# Patient Record
Sex: MALE | Race: BLACK OR AFRICAN AMERICAN | Employment: FULL TIME | URBAN - METROPOLITAN AREA
[De-identification: names, ages, dates, MRNs, and addresses within clinical notes are randomized per-mention and may not be internally consistent; named-entity substitution may affect disease eponyms.]

---

## 2024-04-23 ENCOUNTER — OFFICE VISIT (OUTPATIENT)
Dept: UROLOGY | Facility: CLINIC | Age: 44
End: 2024-04-23
Payer: COMMERCIAL

## 2024-04-23 VITALS
HEART RATE: 68 BPM | BODY MASS INDEX: 37.23 KG/M2 | WEIGHT: 251.4 LBS | SYSTOLIC BLOOD PRESSURE: 129 MMHG | HEIGHT: 69 IN | OXYGEN SATURATION: 99 % | DIASTOLIC BLOOD PRESSURE: 87 MMHG | TEMPERATURE: 97.3 F

## 2024-04-23 DIAGNOSIS — R35.0 URINARY FREQUENCY: Primary | ICD-10-CM

## 2024-04-23 LAB
ALBUMIN UR-MCNC: NEGATIVE MG/DL
APPEARANCE UR: CLEAR
BILIRUB UR QL STRIP: NEGATIVE
COLOR UR AUTO: YELLOW
GLUCOSE UR STRIP-MCNC: NEGATIVE MG/DL
HGB UR QL STRIP: NEGATIVE
KETONES UR STRIP-MCNC: NEGATIVE MG/DL
LEUKOCYTE ESTERASE UR QL STRIP: NEGATIVE
NITRATE UR QL: NEGATIVE
PH UR STRIP: 7 [PH] (ref 5–7)
SP GR UR STRIP: 1.01 (ref 1–1.03)
UROBILINOGEN UR STRIP-ACNC: 2 E.U./DL

## 2024-04-23 PROCEDURE — 99203 OFFICE O/P NEW LOW 30 MIN: CPT | Performed by: STUDENT IN AN ORGANIZED HEALTH CARE EDUCATION/TRAINING PROGRAM

## 2024-04-23 PROCEDURE — 81003 URINALYSIS AUTO W/O SCOPE: CPT | Performed by: STUDENT IN AN ORGANIZED HEALTH CARE EDUCATION/TRAINING PROGRAM

## 2024-04-23 RX ORDER — SOLIFENACIN SUCCINATE 5 MG/1
5 TABLET, FILM COATED ORAL DAILY
Qty: 90 TABLET | Refills: 1 | Status: SHIPPED | OUTPATIENT
Start: 2024-04-23

## 2024-04-23 ASSESSMENT — PAIN SCALES - GENERAL: PAINLEVEL: NO PAIN (0)

## 2024-04-23 NOTE — NURSING NOTE
"Initial /87   Pulse 68   Temp 97.3  F (36.3  C) (Tympanic)   Ht 1.746 m (5' 8.75\")   Wt 114 kg (251 lb 6.4 oz)   SpO2 99%   BMI 37.40 kg/m   Estimated body mass index is 37.4 kg/m  as calculated from the following:    Height as of this encounter: 1.746 m (5' 8.75\").    Weight as of this encounter: 114 kg (251 lb 6.4 oz). .  Active order to obtain bladder scan? Yes   Name of ordering provider:  Connie Blank  Bladder scan preformed post void Yes, about 15 minutes after.  Bladder scan reveled 15ML  Provider notified?  Yes    Ronel BANKS CMA        "

## 2024-04-23 NOTE — PROGRESS NOTES
"        Chief Complaint:   LUTS         History of Present Illness:   Jian Lindsay is a 43 year old male with no significant past medical history who presents for evaluation of LUTS.     The patient reports a 2-3 year history of urinary frequency, urgency, occasional urge incontinence, and nocturia x 2-3. He reports occasional sensation of incomplete bladder emptying. He denies weak stream, dysuria, and gross hematuria.     He reports regular bowel movements.     He drinks water, 2 cups of coffee, and Gatorade.          Past Medical History:   No past medical history on file.         Past Surgical History:   No past surgical history on file.         Medications     No current outpatient medications on file.     No current facility-administered medications for this visit.            Allergies:   Patient has no known allergies.         Review of Systems:  From intake questionnaire   Negative 14 system review except as noted on HPI, nurse's note.         Physical Exam:   Patient is a 43 year old  male   Vitals: Blood pressure 129/87, pulse 68, temperature 97.3  F (36.3  C), temperature source Tympanic, height 1.746 m (5' 8.75\"), weight 114 kg (251 lb 6.4 oz), SpO2 99%.  General Appearance Adult: Alert, no acute distress, oriented.  Lungs: Non-labored breathing.  Heart: No obvious jugular venous distension present.  Neuro: Alert, oriented, speech and mentation normal    PVR: 15 mL         Assessment and Plan:     Assessment: 43 year old male seen in evaluation for urinary frequency, urgency, occasional urge incontinence, and nocturia x 2-3. He reports occasional sensation of incomplete bladder emptying. His PVR was 15 mL. UA was unremarkable.     We discussed overactive bladder as a possible cause for his symptoms. We discussed treatment options including pelvic floor physical therapy, anticholinergics, and third line procedures. The patient would like to try a medication.     Plan:  Start solifenacin 5 mg daily. Side " effects reviewed.   Follow up in 2-3 months, sooner if concerns.     EVON ROCHE PA-C  Department of Urology

## 2024-09-22 ENCOUNTER — HEALTH MAINTENANCE LETTER (OUTPATIENT)
Age: 44
End: 2024-09-22

## 2024-10-01 ENCOUNTER — OFFICE VISIT (OUTPATIENT)
Dept: FAMILY MEDICINE | Facility: CLINIC | Age: 44
End: 2024-10-01
Payer: COMMERCIAL

## 2024-10-01 VITALS
TEMPERATURE: 97.9 F | BODY MASS INDEX: 36.76 KG/M2 | RESPIRATION RATE: 16 BRPM | OXYGEN SATURATION: 99 % | HEART RATE: 67 BPM | DIASTOLIC BLOOD PRESSURE: 81 MMHG | HEIGHT: 69 IN | WEIGHT: 248.2 LBS | SYSTOLIC BLOOD PRESSURE: 137 MMHG

## 2024-10-01 DIAGNOSIS — Z13.220 LIPID SCREENING: ICD-10-CM

## 2024-10-01 DIAGNOSIS — Z13.1 SCREENING FOR DIABETES MELLITUS: ICD-10-CM

## 2024-10-01 DIAGNOSIS — L73.0 ACNE KELOIDALIS NUCHAE: ICD-10-CM

## 2024-10-01 DIAGNOSIS — N50.89 TESTICULAR LUMP: ICD-10-CM

## 2024-10-01 DIAGNOSIS — Z00.00 ROUTINE GENERAL MEDICAL EXAMINATION AT A HEALTH CARE FACILITY: Primary | ICD-10-CM

## 2024-10-01 DIAGNOSIS — R79.89 LOW TESTOSTERONE IN MALE: ICD-10-CM

## 2024-10-01 LAB
ALBUMIN UR-MCNC: NEGATIVE MG/DL
APPEARANCE UR: CLEAR
BASOPHILS # BLD AUTO: 0 10E3/UL (ref 0–0.2)
BASOPHILS NFR BLD AUTO: 0 %
BILIRUB UR QL STRIP: NEGATIVE
COLOR UR AUTO: YELLOW
EOSINOPHIL # BLD AUTO: 0.1 10E3/UL (ref 0–0.7)
EOSINOPHIL NFR BLD AUTO: 2 %
ERYTHROCYTE [DISTWIDTH] IN BLOOD BY AUTOMATED COUNT: 11.8 % (ref 10–15)
EST. AVERAGE GLUCOSE BLD GHB EST-MCNC: 105 MG/DL
GLUCOSE UR STRIP-MCNC: NEGATIVE MG/DL
HBA1C MFR BLD: 5.3 % (ref 0–5.6)
HCT VFR BLD AUTO: 46.5 % (ref 40–53)
HGB BLD-MCNC: 16.1 G/DL (ref 13.3–17.7)
HGB UR QL STRIP: ABNORMAL
IMM GRANULOCYTES # BLD: 0 10E3/UL
IMM GRANULOCYTES NFR BLD: 0 %
KETONES UR STRIP-MCNC: NEGATIVE MG/DL
LEUKOCYTE ESTERASE UR QL STRIP: NEGATIVE
LYMPHOCYTES # BLD AUTO: 2.4 10E3/UL (ref 0.8–5.3)
LYMPHOCYTES NFR BLD AUTO: 42 %
MCH RBC QN AUTO: 29.9 PG (ref 26.5–33)
MCHC RBC AUTO-ENTMCNC: 34.6 G/DL (ref 31.5–36.5)
MCV RBC AUTO: 86 FL (ref 78–100)
MONOCYTES # BLD AUTO: 0.4 10E3/UL (ref 0–1.3)
MONOCYTES NFR BLD AUTO: 6 %
NEUTROPHILS # BLD AUTO: 2.8 10E3/UL (ref 1.6–8.3)
NEUTROPHILS NFR BLD AUTO: 49 %
NITRATE UR QL: NEGATIVE
PH UR STRIP: 6.5 [PH] (ref 5–7)
PLATELET # BLD AUTO: 165 10E3/UL (ref 150–450)
RBC # BLD AUTO: 5.38 10E6/UL (ref 4.4–5.9)
RBC #/AREA URNS AUTO: ABNORMAL /HPF
SP GR UR STRIP: 1.01 (ref 1–1.03)
SQUAMOUS #/AREA URNS AUTO: ABNORMAL /LPF
UROBILINOGEN UR STRIP-ACNC: 1 E.U./DL
WBC # BLD AUTO: 5.7 10E3/UL (ref 4–11)
WBC #/AREA URNS AUTO: ABNORMAL /HPF

## 2024-10-01 PROCEDURE — 36415 COLL VENOUS BLD VENIPUNCTURE: CPT | Performed by: PHYSICIAN ASSISTANT

## 2024-10-01 PROCEDURE — 81001 URINALYSIS AUTO W/SCOPE: CPT | Performed by: PHYSICIAN ASSISTANT

## 2024-10-01 PROCEDURE — 80053 COMPREHEN METABOLIC PANEL: CPT | Performed by: PHYSICIAN ASSISTANT

## 2024-10-01 PROCEDURE — 84403 ASSAY OF TOTAL TESTOSTERONE: CPT | Performed by: PHYSICIAN ASSISTANT

## 2024-10-01 PROCEDURE — 84270 ASSAY OF SEX HORMONE GLOBUL: CPT | Performed by: PHYSICIAN ASSISTANT

## 2024-10-01 PROCEDURE — 99213 OFFICE O/P EST LOW 20 MIN: CPT | Mod: 25 | Performed by: PHYSICIAN ASSISTANT

## 2024-10-01 PROCEDURE — 83036 HEMOGLOBIN GLYCOSYLATED A1C: CPT | Performed by: PHYSICIAN ASSISTANT

## 2024-10-01 PROCEDURE — 99386 PREV VISIT NEW AGE 40-64: CPT | Mod: 25 | Performed by: PHYSICIAN ASSISTANT

## 2024-10-01 PROCEDURE — 85025 COMPLETE CBC W/AUTO DIFF WBC: CPT | Performed by: PHYSICIAN ASSISTANT

## 2024-10-01 PROCEDURE — 80061 LIPID PANEL: CPT | Performed by: PHYSICIAN ASSISTANT

## 2024-10-01 PROCEDURE — 90471 IMMUNIZATION ADMIN: CPT | Performed by: PHYSICIAN ASSISTANT

## 2024-10-01 PROCEDURE — 90656 IIV3 VACC NO PRSV 0.5 ML IM: CPT | Performed by: PHYSICIAN ASSISTANT

## 2024-10-01 RX ORDER — MUPIROCIN 20 MG/G
OINTMENT TOPICAL 2 TIMES DAILY
COMMUNITY
Start: 2024-09-16

## 2024-10-01 RX ORDER — MOMETASONE FUROATE 1 MG/ML
SOLUTION TOPICAL DAILY
COMMUNITY
Start: 2024-09-10

## 2024-10-01 RX ORDER — CLINDAMYCIN AND BENZOYL PEROXIDE 10; 50 MG/G; MG/G
GEL TOPICAL 2 TIMES DAILY
COMMUNITY
Start: 2024-09-01

## 2024-10-01 RX ORDER — DOXYCYCLINE 100 MG/1
100 CAPSULE ORAL 2 TIMES DAILY
COMMUNITY
Start: 2024-09-10

## 2024-10-01 SDOH — HEALTH STABILITY: PHYSICAL HEALTH: ON AVERAGE, HOW MANY MINUTES DO YOU ENGAGE IN EXERCISE AT THIS LEVEL?: 100 MIN

## 2024-10-01 SDOH — HEALTH STABILITY: PHYSICAL HEALTH: ON AVERAGE, HOW MANY DAYS PER WEEK DO YOU ENGAGE IN MODERATE TO STRENUOUS EXERCISE (LIKE A BRISK WALK)?: 5 DAYS

## 2024-10-01 ASSESSMENT — PAIN SCALES - GENERAL: PAINLEVEL: NO PAIN (0)

## 2024-10-01 ASSESSMENT — SOCIAL DETERMINANTS OF HEALTH (SDOH): HOW OFTEN DO YOU GET TOGETHER WITH FRIENDS OR RELATIVES?: MORE THAN THREE TIMES A WEEK

## 2024-10-01 NOTE — LETTER
October 8, 2024      Jian Lindsay  [address]  [place] [address]        Dear Mr. Lindsay,      Your testosterone is slightly low. I am going to place a future order to recheck this in 2-6 weeks. Please make an early morning lab only appointment to have this completed.      LDL cholesterol as well as triglycerides elevated.      -LDL(bad) cholesterol level is elevated, and your triglycerides are elevated which can increase your heart disease risk.  A diet high in fat and simple carbohydrates, genetics and being overweight can contribute to this. ADVISE: exercising 150 minutes of aerobic exercise per week (30 minutes for 5 days per week or 50 minutes for 3 days per week are options), eating a low saturated fat/low carbohydrate diet, and omega-3 fatty acids (fish oil) 1367-1798 mg daily are helpful to improve this. In 12 months, you should recheck your fasting cholesterol panel by scheduling a lab-only appointment.      Normal hemoglobin A1c without evidence of prediabetes or diabetes.      If any further questions please reach out to the clinic.      Resulted Orders   Lipid panel reflex to direct LDL Non-fasting   Result Value Ref Range    Cholesterol 200 (H) <200 mg/dL    Triglycerides 193 (H) <150 mg/dL    Direct Measure HDL 38 (L) >=40 mg/dL    LDL Cholesterol Calculated 123 (H) <100 mg/dL    Non HDL Cholesterol 162 (H) <130 mg/dL    Patient Fasting > 8hrs? No     Narrative    Cholesterol  Desirable: < 200 mg/dL  Borderline High: 200 - 239 mg/dL  High: >= 240 mg/dL    Triglycerides  Normal: < 150 mg/dL  Borderline High: 150 - 199 mg/dL  High: 200-499 mg/dL  Very High: >= 500 mg/dL    Direct Measure HDL  Female: >= 50 mg/dL   Male: >= 40 mg/dL    LDL Cholesterol  Desirable: < 100 mg/dL  Above Desirable: 100 - 129 mg/dL   Borderline High: 130 - 159 mg/dL   High:  160 - 189 mg/dL   Very High: >= 190 mg/dL    Non HDL Cholesterol  Desirable: < 130 mg/dL  Above Desirable: 130 - 159 mg/dL  Borderline High: 160 -  189 mg/dL  High: 190 - 219 mg/dL  Very High: >= 220 mg/dL   Hemoglobin A1c   Result Value Ref Range    Estimated Average Glucose 105 <117 mg/dL    Hemoglobin A1C 5.3 0.0 - 5.6 %      Comment:      Normal <5.7%   Prediabetes 5.7-6.4%    Diabetes 6.5% or higher     Note: Adopted from ADA consensus guidelines.   Comprehensive metabolic panel (BMP + Alb, Alk Phos, ALT, AST, Total. Bili, TP)   Result Value Ref Range    Sodium 141 135 - 145 mmol/L    Potassium 3.9 3.4 - 5.3 mmol/L    Carbon Dioxide (CO2) 28 22 - 29 mmol/L    Anion Gap 8 7 - 15 mmol/L    Urea Nitrogen 8.8 6.0 - 20.0 mg/dL    Creatinine 1.07 0.67 - 1.17 mg/dL    GFR Estimate 88 >60 mL/min/1.73m2      Comment:      eGFR calculated using 2021 CKD-EPI equation.    Calcium 9.1 8.8 - 10.4 mg/dL      Comment:      Reference intervals for this test were updated on 7/16/2024 to reflect our healthy population more accurately. There may be differences in the flagging of prior results with similar values performed with this method. Those prior results can be interpreted in the context of the updated reference intervals.    Chloride 105 98 - 107 mmol/L    Glucose 104 (H) 70 - 99 mg/dL    Alkaline Phosphatase 75 40 - 150 U/L    AST 22 0 - 45 U/L    ALT 23 0 - 70 U/L    Protein Total 7.7 6.4 - 8.3 g/dL    Albumin 4.2 3.5 - 5.2 g/dL    Bilirubin Total 0.7 <=1.2 mg/dL    Patient Fasting > 8hrs? No    UA Macroscopic with reflex to Microscopic and Culture - Lab Collect   Result Value Ref Range    Color Urine Yellow Colorless, Straw, Light Yellow, Yellow    Appearance Urine Clear Clear    Glucose Urine Negative Negative mg/dL    Bilirubin Urine Negative Negative    Ketones Urine Negative Negative mg/dL    Specific Gravity Urine 1.015 1.003 - 1.035    Blood Urine Trace (A) Negative    pH Urine 6.5 5.0 - 7.0    Protein Albumin Urine Negative Negative mg/dL    Urobilinogen Urine 1.0 0.2, 1.0 E.U./dL    Nitrite Urine Negative Negative    Leukocyte Esterase Urine Negative Negative    CBC with platelets and differential   Result Value Ref Range    WBC Count 5.7 4.0 - 11.0 10e3/uL    RBC Count 5.38 4.40 - 5.90 10e6/uL    Hemoglobin 16.1 13.3 - 17.7 g/dL    Hematocrit 46.5 40.0 - 53.0 %    MCV 86 78 - 100 fL    MCH 29.9 26.5 - 33.0 pg    MCHC 34.6 31.5 - 36.5 g/dL    RDW 11.8 10.0 - 15.0 %    Platelet Count 165 150 - 450 10e3/uL    % Neutrophils 49 %    % Lymphocytes 42 %    % Monocytes 6 %    % Eosinophils 2 %    % Basophils 0 %    % Immature Granulocytes 0 %    Absolute Neutrophils 2.8 1.6 - 8.3 10e3/uL    Absolute Lymphocytes 2.4 0.8 - 5.3 10e3/uL    Absolute Monocytes 0.4 0.0 - 1.3 10e3/uL    Absolute Eosinophils 0.1 0.0 - 0.7 10e3/uL    Absolute Basophils 0.0 0.0 - 0.2 10e3/uL    Absolute Immature Granulocytes 0.0 <=0.4 10e3/uL   Sex Hormone Binding Globulin   Result Value Ref Range    Sex Hormone Binding Globulin 25 11 - 80 nmol/L   Testosterone Free and Total   Result Value Ref Range    Free Testosterone Calculated 5.06 ng/dL      Comment:      Male Danyel Ranges:  Danyel Stage I: Less than or equal to 0.37 ng/dL  Danyel Stage II: 0.03-2.1 ng/dL  Danyel Stage III: 0.10-9.8 ng/dL  Danyel Stage IV: 3.5-16.9 ng/dL  Danyel Stage V: 4.1-23.9 ng/dL    Testosterone Total 226 (L) 240 - 950 ng/dL    Narrative    This test was developed and its performance characteristics determined by the [facility],  Special [facility] Laboratory. It has not been cleared or approved by the FDA. The laboratory is regulated under CLIA as qualified to perform high-complexity testing. This test is used for clinical purposes. It should not be regarded as investigational or for research.   UA Microscopic with Reflex to Culture   Result Value Ref Range    RBC Urine None Seen 0-2 /HPF /HPF    WBC Urine None Seen 0-5 /HPF /HPF    Squamous Epithelials Urine Few (A) None Seen /LPF    Narrative    Urine Culture not indicated     If you have any questions or concerns, please call the clinic at the  number listed above.     Sincerely,        Alexandru Srinivasan PA-C/bmc

## 2024-10-01 NOTE — PATIENT INSTRUCTIONS
Patient Education   Preventive Care Advice   This is general advice given by our system to help you stay healthy. However, your care team may have specific advice just for you. Please talk to your care team about your preventive care needs.  Nutrition  Eat 5 or more servings of fruits and vegetables each day.  Try wheat bread, brown rice and whole grain pasta (instead of white bread, rice, and pasta).  Get enough calcium and vitamin D. Check the label on foods and aim for 100% of the RDA (recommended daily allowance).  Lifestyle  Exercise at least 150 minutes each week  (30 minutes a day, 5 days a week).  Do muscle strengthening activities 2 days a week. These help control your weight and prevent disease.  No smoking.  Wear sunscreen to prevent skin cancer.  Have a dental exam and cleaning every 6 months.  Yearly exams  See your health care team every year to talk about:  Any changes in your health.  Any medicines your care team has prescribed.  Preventive care, family planning, and ways to prevent chronic diseases.  Shots (vaccines)   HPV shots (up to age 26), if you've never had them before.  Hepatitis B shots (up to age 59), if you've never had them before.  COVID-19 shot: Get this shot when it's due.  Flu shot: Get a flu shot every year.  Tetanus shot: Get a tetanus shot every 10 years.  Pneumococcal, hepatitis A, and RSV shots: Ask your care team if you need these based on your risk.  Shingles shot (for age 50 and up)  General health tests  Diabetes screening:  Starting at age 35, Get screened for diabetes at least every 3 years.  If you are younger than age 35, ask your care team if you should be screened for diabetes.  Cholesterol test: At age 39, start having a cholesterol test every 5 years, or more often if advised.  Bone density scan (DEXA): At age 50, ask your care team if you should have this scan for osteoporosis (brittle bones).  Hepatitis C: Get tested at least once in your life.  STIs (sexually  transmitted infections)  Before age 24: Ask your care team if you should be screened for STIs.  After age 24: Get screened for STIs if you're at risk. You are at risk for STIs (including HIV) if:  You are sexually active with more than one person.  You don't use condoms every time.  You or a partner was diagnosed with a sexually transmitted infection.  If you are at risk for HIV, ask about PrEP medicine to prevent HIV.  Get tested for HIV at least once in your life, whether you are at risk for HIV or not.  Cancer screening tests  Cervical cancer screening: If you have a cervix, begin getting regular cervical cancer screening tests starting at age 21.  Breast cancer scan (mammogram): If you've ever had breasts, begin having regular mammograms starting at age 40. This is a scan to check for breast cancer.  Colon cancer screening: It is important to start screening for colon cancer at age 45.  Have a colonoscopy test every 10 years (or more often if you're at risk) Or, ask your provider about stool tests like a FIT test every year or Cologuard test every 3 years.  To learn more about your testing options, visit:   .  For help making a decision, visit:   https://bit.ly/ic17573.  Prostate cancer screening test: If you have a prostate, ask your care team if a prostate cancer screening test (PSA) at age 55 is right for you.  Lung cancer screening: If you are a current or former smoker ages 50 to 80, ask your care team if ongoing lung cancer screenings are right for you.  For informational purposes only. Not to replace the advice of your health care provider. Copyright   2023 [facility]. All rights reserved. Clinically reviewed by the [facility]. [employer] 034019 - REV 01/24.  Safer Sex: Care Instructions  Overview  Safer sex is a way to reduce your risk of getting a sexually transmitted infection (STI). It can also help prevent pregnancy.  Several products can help you practice  safer sex and reduce your chance of STIs. One of the best is a condom. There are internal and external condoms. You can use a special rubber sheet (dental dam) for protection during oral sex. Disposable gloves can keep your hands from touching blood, semen, or other body fluids that can carry infections.  Remember that birth control methods such as diaphragms, IUDs, foams, and birth control pills do not stop you from getting STIs.  Follow-up care is a key part of your treatment and safety. Be sure to make and go to all appointments, and call your doctor if you are having problems. It's also a good idea to know your test results and keep a list of the medicines you take.  How can you care for yourself at home?  Think about getting vaccinated to help prevent hepatitis A, hepatitis B, and human papillomavirus (HPV). They can be spread through sex.  Use a condom every time you have sex. Use an external condom, which goes on the penis. Or use an internal condom, which goes into the vagina or anus.  Make sure you use the right size external condom. A condom that's too small can break easily. A condom that's too big can slip off during sex.  Use a new condom each time you have sex. Be careful not to poke a hole in the condom when you open the wrapper.  Don't use an internal condom and an external condom at the same time.  Never use petroleum jelly (such as Vaseline), grease, hand lotion, baby oil, or anything with oil in it. These products can make holes in the condom.  After intercourse, hold the edge of the condom as you remove it. This will help keep semen from spilling out of the condom.  Do not have sex with anyone who has symptoms of an STI, such as sores on the genitals or mouth.  Do not drink a lot of alcohol or use drugs before sex.  Limit your sex partners. Sex with one partner who has sex only with you can reduce your risk of getting an STI.  Don't share sex toys. But if you do share them, use a condom and clean  "the sex toys between each use.  Talk to any partners before you have sex. Talk about what you feel comfortable with and whether you have any boundaries with sex. And find out if your partner or partners may be at risk for any STI. Keep in mind that a person may be able to spread an STI even if they do not have symptoms. You and any partners may want to get tested for STIs.  Where can you learn more?  Go to https://www.[employer].net/patiented  Enter B608 in the search box to learn more about \"Safer Sex: Care Instructions.\"  Current as of: November 27, 2023               Content Version: 14.0    2315-1307 ice.   Care instructions adapted under license by your healthcare professional. If you have questions about a medical condition or this instruction, always ask your healthcare professional. ice disclaims any warranty or liability for your use of this information.         "

## 2024-10-01 NOTE — PROGRESS NOTES
Preventive Care Visit  [facility]  Alexandru Srinivasan PA-C, Physician Assistant - Medical  Oct 1, 2024      Assessment & Plan     (Z00.00) Routine general medical examination at a health care facility  (primary encounter diagnosis)  Comment: Here for routine screening examination.  Plan: Comprehensive metabolic panel (BMP + Alb, Alk         Phos, ALT, AST, Total. 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   Plan:   (R79.89) Low testosterone in male  Comment: History of low testosterone was on testosterone therapy in the past.  Has not followed with endocrinology for multiple years.  Discussed with patient recheck of testosterone.     Plan: Testosterone Free and Total          (Z13.220) Lipid screening  Comment:  Nonfasting lipid screen.   Plan: Lipid panel reflex to direct LDL Non-fasting          (Z13.1) Screening for diabetes mellitus  Comment:  Discussed screening hemoglobin A1c.  Plan: Hemoglobin A1c          (N50.89) Testicular lump  Comment: Patient has been experiencing a lump over his left testicle.  On examination there is a small palpable nodule posterior aspect left testicle.  Discussed potential for epididymal cyst.  Patient does not have any pain.  No blood with ejaculate.  Discussed urinalysis as well as ultrasound of the scrotum.  Patient will continue to monitor.  Potential urology referral if more painful or more concerning finding on ultrasound.  Patient was comfortable with plan.  Plan: UA Macroscopic with reflex to Microscopic and         Culture - Lab Collect, US Testicular & Scrotum         w Doppler Ltd, UA Microscopic with Reflex to         Culture            BMI  Estimated body mass index is 36.65 kg/m  as calculated from the  "following:    Height as of this encounter: 1.753 m (5' 9\").    Weight as of this encounter: 112.6 kg (248 lb 3.2 oz).   Weight management plan: Discussed healthy diet and exercise guidelines    Counseling  Appropriate preventive services were addressed with this patient via screening, questionnaire, or discussion as appropriate for fall prevention, nutrition, physical activity, Tobacco-use cessation, social engagement, weight loss and cognition.  Checklist reviewing preventive services available has been given to the patient.  Reviewed patient's diet, addressing concerns and/or questions.   The patient was instructed to see the dentist every 6 months.     Alla Villar is a 43 year old, presenting for the following:  Physical        10/1/2024    10:36 AM   Additional Questions   Roomed by LAUREEN ANDRADE   Accompanied by SELF        Health Care Directive  Patient does not have a Health Care Directive or Living Will: Discussed advance care planning with patient; information given to patient to review.    HPI    For the last 6 months or so patient has noted a lump over his left testicle.  There is no pain with this.  Denies any urinary symptoms.  No blood in ejaculate.  No family history of prostate cancer or colon cancer.    Patient works security/Public Safety.    He is not a smoker.    Alcohol once per month.      Does snore at times when very tired.  Discussed possibility of sleep evaluation.  He has had some erratic sleep patterns as he typically wakes between 5 and 6 AM and works second shift.    Not a smoker.     No exercise outside of the patrol.     Caffeine - 5 small cups during work days.     Wife stopped working due to needs for youngest daughter. Has had some food insecurities with this. Has been improved since wife started part time again.     Has 7 kids. 2 kids in [place] with immigration status pending.     Eye exam every 2 years.     No recent dental exam.     History of acne keloidalis nuchae.  " Currently on doxycycline as well as Clindamycin-Benzoyl Peroxide 1-5 % Top gel; Apply to back of scalp once daily when flaring, otherwise use every other day for maintenance.  Has followed with dermatology for this in the past.            10/1/2024   General Health   How would you rate your overall physical health? Good   Feel stress (tense, anxious, or unable to sleep) To some extent          10/1/2024   Nutrition   Three or more servings of calcium each day? (!) I DON'T KNOW   Diet: I don't know   How many servings of fruit and vegetables per day? (!) I DON'T KNOW   How many sweetened beverages each day? (!) 2          10/1/2024   Exercise   Days per week of moderate/strenous exercise 5 days   Average minutes spent exercising at this level 100 min          10/1/2024   Social Factors   Frequency of gathering with friends or relatives More than three times a week   Worry food won't last until get money to buy more Yes   Food not last or not have enough money for food? Yes   Do you have housing? (Housing is defined as stable permanent housing and does not include staying ouside in a car, in a tent, in an abandoned building, in an overnight shelter, or couch-surfing.) Yes   Are you worried about losing your housing? No   Lack of transportation? No   Unable to get utilities (heat,electricity)? No      (!) FOOD SECURITY CONCERN PRESENT      10/1/2024   Dental   Dentist two times every year? (!) NO          10/1/2024   TB Screening   Were you born outside of the [place]? Yes          10/1/2024   Substance Use   Alcohol more than 3/day or more than 7/wk No   Do you use any other substances recreationally? No      Social History     Tobacco Use    Smoking status: Former     Types: Cigars    Smokeless tobacco: Never    Tobacco comments:     During high school days.    Vaping Use    Vaping status: Never Used   Substance Use Topics    Drug use: Not Currently     Types: Marijuana         10/1/2024   One time HIV Screening  "  Previous HIV test? Yes          10/1/2024   STI Screening   New sexual partner(s) since last STI/HIV test? No       ASCVD Risk   The ASCVD Risk score (Marilu FALK, et al., 2019) failed to calculate for the following reasons:    Cannot find a previous HDL lab    Cannot find a previous total cholesterol lab        10/1/2024   Contraception/Family Planning   Questions about contraception or family planning No      Reviewed and updated as needed this visit by Provider                    History reviewed. No pertinent past medical history.  History reviewed. No pertinent surgical history.      Review of Systems  Constitutional, HEENT, cardiovascular, pulmonary, gi and gu systems are negative, except as otherwise noted.     Objective    Exam  /81   Pulse 67   Temp 97.9  F (36.6  C) (Tympanic)   Resp 16   Ht 1.753 m (5' 9\")   Wt 112.6 kg (248 lb 3.2 oz)   SpO2 99%   BMI 36.65 kg/m     Estimated body mass index is 36.65 kg/m  as calculated from the following:    Height as of this encounter: 1.753 m (5' 9\").    Weight as of this encounter: 112.6 kg (248 lb 3.2 oz).    Physical Exam  GENERAL: alert, no distress, and over weight  EYES: Eyes grossly normal to inspection, PERRL and conjunctivae and sclerae normal  HENT: ear canals and TM's normal, nose and mouth without ulcers or lesions  NECK: no adenopathy, no asymmetry, masses, or scars  RESP: lungs clear to auscultation - no rales, rhonchi or wheezes  CV: regular rate and rhythm, normal S1 S2, no S3 or S4, no murmur, click or rub, no peripheral edema  ABDOMEN: soft, nontender, no hepatosplenomegaly, no masses and bowel sounds normal   (male): testicular mass left posterior aspect over the epididymis, no hernias, and penis normal without urethral discharge  MS: no gross musculoskeletal defects noted, no edema  SKIN: no suspicious lesions or rashes  NEURO: Normal strength and tone, mentation intact and speech normal  PSYCH: mentation appears normal, " affect normal/bright        Signed Electronically by: Alexandru Srinivasan PA-C

## 2024-10-02 ENCOUNTER — ANCILLARY PROCEDURE (OUTPATIENT)
Dept: ULTRASOUND IMAGING | Facility: OTHER | Age: 44
End: 2024-10-02
Attending: PHYSICIAN ASSISTANT
Payer: COMMERCIAL

## 2024-10-02 DIAGNOSIS — N50.89 TESTICULAR LUMP: ICD-10-CM

## 2024-10-02 LAB
ALBUMIN SERPL BCG-MCNC: 4.2 G/DL (ref 3.5–5.2)
ALP SERPL-CCNC: 75 U/L (ref 40–150)
ALT SERPL W P-5'-P-CCNC: 23 U/L (ref 0–70)
ANION GAP SERPL CALCULATED.3IONS-SCNC: 8 MMOL/L (ref 7–15)
AST SERPL W P-5'-P-CCNC: 22 U/L (ref 0–45)
BILIRUB SERPL-MCNC: 0.7 MG/DL
BUN SERPL-MCNC: 8.8 MG/DL (ref 6–20)
CALCIUM SERPL-MCNC: 9.1 MG/DL (ref 8.8–10.4)
CHLORIDE SERPL-SCNC: 105 MMOL/L (ref 98–107)
CHOLEST SERPL-MCNC: 200 MG/DL
CREAT SERPL-MCNC: 1.07 MG/DL (ref 0.67–1.17)
EGFRCR SERPLBLD CKD-EPI 2021: 88 ML/MIN/1.73M2
FASTING STATUS PATIENT QL REPORTED: NO
FASTING STATUS PATIENT QL REPORTED: NO
GLUCOSE SERPL-MCNC: 104 MG/DL (ref 70–99)
HCO3 SERPL-SCNC: 28 MMOL/L (ref 22–29)
HDLC SERPL-MCNC: 38 MG/DL
LDLC SERPL CALC-MCNC: 123 MG/DL
NONHDLC SERPL-MCNC: 162 MG/DL
POTASSIUM SERPL-SCNC: 3.9 MMOL/L (ref 3.4–5.3)
PROT SERPL-MCNC: 7.7 G/DL (ref 6.4–8.3)
SHBG SERPL-SCNC: 25 NMOL/L (ref 11–80)
SODIUM SERPL-SCNC: 141 MMOL/L (ref 135–145)
TRIGL SERPL-MCNC: 193 MG/DL

## 2024-10-02 PROCEDURE — 76870 US EXAM SCROTUM: CPT | Mod: TC | Performed by: RADIOLOGY

## 2024-10-02 PROCEDURE — 93976 VASCULAR STUDY: CPT | Mod: TC | Performed by: RADIOLOGY

## 2024-10-03 LAB
TESTOST FREE SERPL-MCNC: 5.06 NG/DL
TESTOST SERPL-MCNC: 226 NG/DL (ref 240–950)

## 2024-10-21 DIAGNOSIS — R35.0 URINARY FREQUENCY: ICD-10-CM

## 2024-10-21 RX ORDER — SOLIFENACIN SUCCINATE 5 MG/1
5 TABLET, FILM COATED ORAL DAILY
Qty: 90 TABLET | Refills: 1 | Status: SHIPPED | OUTPATIENT
Start: 2024-10-21

## 2024-10-21 NOTE — TELEPHONE ENCOUNTER
Requested Prescriptions   Pending Prescriptions Disp Refills    solifenacin (VESICARE) 5 MG tablet 90 tablet 1     Sig: Take 1 tablet (5 mg) by mouth daily.       There is no refill protocol information for this order        Last office visit: 4/23/2024 ; last virtual visit: Visit date not found with prescribing provider:  Connie Blank   Future Office Visit:      Thank you,  Tara Ohara  [facility]  [address] [place] [address]  Priority line: [phone] (please do not share number with patient)   Employed by [facility]

## 2024-10-21 NOTE — TELEPHONE ENCOUNTER
Last Office/video Visit: 4/23/2024  Next office Visit Due: 7/2024  Next Scheduled: M-DAQt message sent    We discussed overactive bladder as a possible cause for his symptoms. We discussed treatment options including pelvic floor physical therapy, anticholinergics, and third line procedures. The patient would like to try a medication.      Plan:  Start solifenacin 5 mg daily. Side effects reviewed.   Follow up in 2-3 months, sooner if concerns.      Refill sent

## 2024-10-30 ENCOUNTER — ANCILLARY PROCEDURE (OUTPATIENT)
Dept: GENERAL RADIOLOGY | Facility: CLINIC | Age: 44
End: 2024-10-30
Attending: PHYSICIAN ASSISTANT
Payer: COMMERCIAL

## 2024-10-30 ENCOUNTER — OFFICE VISIT (OUTPATIENT)
Dept: FAMILY MEDICINE | Facility: CLINIC | Age: 44
End: 2024-10-30
Payer: COMMERCIAL

## 2024-10-30 VITALS
RESPIRATION RATE: 16 BRPM | OXYGEN SATURATION: 97 % | SYSTOLIC BLOOD PRESSURE: 131 MMHG | WEIGHT: 251.4 LBS | HEART RATE: 64 BPM | BODY MASS INDEX: 37.23 KG/M2 | DIASTOLIC BLOOD PRESSURE: 88 MMHG | HEIGHT: 69 IN | TEMPERATURE: 98 F
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
## 2024-10-30 DIAGNOSIS — M54.50 BILATERAL LOW BACK PAIN WITHOUT SCIATICA, UNSPECIFIED CHRONICITY: ICD-10-CM

## 2024-10-30 DIAGNOSIS — M54.50 BILATERAL LOW BACK PAIN WITHOUT SCIATICA, UNSPECIFIED CHRONICITY: Primary | ICD-10-CM

## 2024-10-30 DIAGNOSIS — S39.012D STRAIN OF MUSCLE, FASCIA AND TENDON OF LOWER BACK, SUBSEQUENT ENCOUNTER: ICD-10-CM

## 2024-10-30 PROCEDURE — 72100 X-RAY EXAM L-S SPINE 2/3 VWS: CPT | Mod: TC | Performed by: RADIOLOGY

## 2024-10-30 PROCEDURE — 99213 OFFICE O/P EST LOW 20 MIN: CPT | Performed by: PHYSICIAN ASSISTANT

## 2024-10-30 RX ORDER — IBUPROFEN 600 MG/1
600 TABLET, FILM COATED ORAL EVERY 8 HOURS PRN
Qty: 30 TABLET | Refills: 0 | Status: SHIPPED | OUTPATIENT
Start: 2024-10-30

## 2024-10-30 RX ORDER — TIZANIDINE 2 MG/1
2 TABLET ORAL 3 TIMES DAILY
Qty: 30 TABLET | Refills: 0 | Status: SHIPPED | OUTPATIENT
Start: 2024-10-30 | End: 2024-11-07

## 2024-10-30 ASSESSMENT — PAIN SCALES - GENERAL: PAINLEVEL_OUTOF10: SEVERE PAIN (6)

## 2024-10-30 ASSESSMENT — ENCOUNTER SYMPTOMS: BACK PAIN: 1
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
## 2024-10-31 ENCOUNTER — LAB (OUTPATIENT)
Dept: LAB | Facility: OTHER | Age: 44
End: 2024-10-31
Payer: COMMERCIAL

## 2024-10-31 DIAGNOSIS — R79.89 LOW TESTOSTERONE IN MALE: ICD-10-CM

## 2024-10-31 PROCEDURE — 36415 COLL VENOUS BLD VENIPUNCTURE: CPT

## 2024-10-31 PROCEDURE — 84403 ASSAY OF TOTAL TESTOSTERONE: CPT

## 2024-11-02 LAB — TESTOST SERPL-MCNC: 151 NG/DL (ref 240–950)

## 2024-11-04 DIAGNOSIS — R79.89 LOW TESTOSTERONE IN MALE: Primary | ICD-10-CM
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
## 2024-11-07 DIAGNOSIS — M54.50 BILATERAL LOW BACK PAIN WITHOUT SCIATICA, UNSPECIFIED CHRONICITY: ICD-10-CM

## 2024-11-07 DIAGNOSIS — S39.012D STRAIN OF MUSCLE, FASCIA AND TENDON OF LOWER BACK, SUBSEQUENT ENCOUNTER: ICD-10-CM

## 2024-11-07 RX ORDER — TIZANIDINE 2 MG/1
2 TABLET ORAL 2 TIMES DAILY PRN
Qty: 20 TABLET | Refills: 0 | Status: SHIPPED | OUTPATIENT
Start: 2024-11-08

## 2024-11-17 DIAGNOSIS — M54.50 BILATERAL LOW BACK PAIN WITHOUT SCIATICA, UNSPECIFIED CHRONICITY: ICD-10-CM

## 2024-11-17 DIAGNOSIS — S39.012D STRAIN OF MUSCLE, FASCIA AND TENDON OF LOWER BACK, SUBSEQUENT ENCOUNTER: ICD-10-CM
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
## 2024-11-18 RX ORDER — TIZANIDINE 2 MG/1
TABLET ORAL
Qty: 20 TABLET | Refills: 0 | OUTPATIENT
Start: 2024-11-18

## 2024-11-18 NOTE — TELEPHONE ENCOUNTER
Called and spoke to patient, appointment made for 11/20/24.Eulalia Kovacs [facility]

## 2024-11-20 ENCOUNTER — OFFICE VISIT (OUTPATIENT)
Dept: FAMILY MEDICINE | Facility: CLINIC | Age: 44
End: 2024-11-20
Payer: COMMERCIAL

## 2024-11-20 VITALS
SYSTOLIC BLOOD PRESSURE: 113 MMHG | OXYGEN SATURATION: 100 % | WEIGHT: 253.2 LBS | TEMPERATURE: 97.4 F | RESPIRATION RATE: 16 BRPM | DIASTOLIC BLOOD PRESSURE: 76 MMHG | HEIGHT: 69 IN | BODY MASS INDEX: 37.5 KG/M2 | HEART RATE: 66 BPM

## 2024-11-20 DIAGNOSIS — S39.012D STRAIN OF MUSCLE, FASCIA AND TENDON OF LOWER BACK, SUBSEQUENT ENCOUNTER: ICD-10-CM

## 2024-11-20 DIAGNOSIS — M54.50 BILATERAL LOW BACK PAIN WITHOUT SCIATICA, UNSPECIFIED CHRONICITY: Primary | ICD-10-CM
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
## 2024-11-20 PROCEDURE — 99213 OFFICE O/P EST LOW 20 MIN: CPT | Performed by: PHYSICIAN ASSISTANT

## 2024-11-20 RX ORDER — TIZANIDINE 2 MG/1
2 TABLET ORAL
Qty: 20 TABLET | Refills: 0 | Status: SHIPPED | OUTPATIENT
Start: 2024-11-20

## 2024-11-20 RX ORDER — LIDOCAINE 4 G/G
3 PATCH TOPICAL EVERY 24 HOURS
Qty: 3 PATCH | Refills: 2 | Status: SHIPPED | OUTPATIENT
Start: 2024-11-20

## 2024-11-20 ASSESSMENT — PAIN SCALES - GENERAL: PAINLEVEL_OUTOF10: NO PAIN (0)

## 2024-11-20 NOTE — PATIENT INSTRUCTIONS
Lidocaine patches to the low back 12 hours at a time     Tylenol 1000 mg every 8 hours. With maximum of 4000 mg per day.
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
## 2024-11-22 PROBLEM — M54.50 BILATERAL LOW BACK PAIN WITHOUT SCIATICA, UNSPECIFIED CHRONICITY: Status: ACTIVE | Noted: 2024-11-22

## 2024-11-27 ENCOUNTER — THERAPY VISIT (OUTPATIENT)
Dept: PHYSICAL THERAPY | Facility: CLINIC | Age: 44
End: 2024-11-27
Attending: PHYSICIAN ASSISTANT
Payer: COMMERCIAL

## 2024-11-27 DIAGNOSIS — M54.50 BILATERAL LOW BACK PAIN WITHOUT SCIATICA, UNSPECIFIED CHRONICITY: Primary | ICD-10-CM

## 2024-11-27 PROCEDURE — 97110 THERAPEUTIC EXERCISES: CPT | Mod: GP | Performed by: PHYSICAL THERAPIST

## 2024-12-04 ENCOUNTER — THERAPY VISIT (OUTPATIENT)
Dept: PHYSICAL THERAPY | Facility: CLINIC | Age: 44
End: 2024-12-04
Attending: PHYSICIAN ASSISTANT
Payer: COMMERCIAL

## 2024-12-04 DIAGNOSIS — M54.50 BILATERAL LOW BACK PAIN WITHOUT SCIATICA, UNSPECIFIED CHRONICITY: Primary | ICD-10-CM

## 2024-12-04 PROCEDURE — 97110 THERAPEUTIC EXERCISES: CPT | Mod: GP | Performed by: PHYSICAL THERAPIST

## 2024-12-04 NOTE — PROGRESS NOTES
12/04/24 0500   Appointment Info   Signing clinician's name / credentials Tony Da Silva, PT   Total/Authorized Visits eval and treat - 5   Visits Used 3   Medical Diagnosis low back pain without sciatica   PT Tx Diagnosis low back pain without sciatica   Other pertinent information [facility] 10/15/2025   Progress Note/Certification   Onset of illness/injury or Date of Surgery 10/15/24   Therapy Frequency 1 time a week for 3 weeks then every other week for 4 weeks   Predicted Duration 7 weeks   Progress Note Due Date 12/04/24   Progress Note Completed Date 11/22/24       Present No   PT Goal 1   Goal Identifier walling/standing   Goal Description patient will be able to be on his feet, walking/standing for 3 hours with weight/work vest, without back pain   Rationale to maximize safety and independence within the home;to maximize safety and independence within the community;to maximize safety and independence with performance of ADLs and functional tasks   Goal Progress no pain with standing or walking but his back will fatigue at about 1.5-2 hours   Target Date 01/10/25   Subjective Report   Subjective Report Patient notes that he is 90% improved.  He is no longer feeling lower back pain, but fatigue.  He will feel fatigue in 1.5-2 hours of standing/walking.  He is not feeling pain with bending or rise from bending when he has his vest on.  Does feel like he is doing well and does not feel like he would need to return to PT. Will keep his chart open until the end of January 2025, to allow more time for strengthening and for patient to make sure he can self manage any flare that may occur.   Objective Measures   Objective Measures Objective Measure 1;Objective Measure 2   Objective Measure 1   Objective Measure LROM   Details nil loss of LROM without back pain   Objective Measure 2   Objective Measure functional tools   Details ANA 6% improved from 12%, Maxi Medium (4/9) at evaluation, today, low  1/9   Treatment Interventions (PT)   Interventions Therapeutic Procedure/Exercise   Therapeutic Procedure/Exercise   Therapeutic Procedures: strength, endurance, ROM, flexibility minutes (46306) 30   Ther Proc 1 discussed doing FISit prior to walking to start his rounds at work.  If develops bask pain, sit if able to do another set of FISit   PTRx Ther Proc 1 Double Knee to Chest   PTRx Ther Proc 1 - Details verbal review   PTRx Ther Proc 2 Sitting Flexion   PTRx Ther Proc 2 - Details 10 reps - feels good to do; EIS looked easier to do   PTRx Ther Proc 3 Shoulder Theraband Low Row/Pulldown   PTRx Ther Proc 3 - Details 10 reps vs black band doubled then did doubled black band in each hand 10 reps   PTRx Ther Proc 4 birddog   PTRx Ther Proc 4 - Details 10 reps ea combination    Skilled Intervention Patient has great technique with all exercises with improved quality of birddogs   Patient Response/Progress NE during, NE after; liked the increased resistance with pull downs   Neuromuscular Re-education   PTRx Neuro Re-ed 1 Pallof Press  (ther ex)   PTRx Neuro Re-ed 1 - Details 2 sets - 10 reps ea side with doubled black band and added a mild rotational component 10 reps for each side   Education   Learner/Method Patient;No Barriers to Learning;Pictures/Video;Demonstration;Reading;Listening   Education Comments PTRx on phone   Plan   Home program per PTRx and above   Updates to plan of care added Pulldown, Pallof Press and bird dogs   Plan for next session will hold chart open through the end of January due to no opening through the first week of January 2025   Comments   Comments Patient is no longer experiencing lower back pain.  He is very consistent with his home program and is able to keep his back pain away at home and at work.  No issues getting into or out of the car, or bending.  Does plan to continue independently but will call to schedule if there is a flare prior to the end of January 2025.   at this time    Total Session Time   Timed Code Treatment Minutes 30   Total Treatment Time (sum of timed and untimed services) 30       PLAN  Continue therapy per current plan of care.  If patient does not return by the end of January 2025, this note will  as a discharge note.     Beginning/End Dates of Progress Note Reporting Period:  11/22/24 to 12/04/2024    Referring Provider:  Alexandru Srinivasan

## 2024-12-12 ENCOUNTER — OFFICE VISIT (OUTPATIENT)
Dept: NEUROSURGERY | Facility: CLINIC | Age: 44
End: 2024-12-12
Attending: PHYSICIAN ASSISTANT
Payer: COMMERCIAL

## 2024-12-12 VITALS
HEART RATE: 59 BPM | WEIGHT: 254 LBS | HEIGHT: 69 IN | TEMPERATURE: 97.6 F | SYSTOLIC BLOOD PRESSURE: 146 MMHG | DIASTOLIC BLOOD PRESSURE: 84 MMHG | BODY MASS INDEX: 37.62 KG/M2

## 2024-12-12 DIAGNOSIS — S39.012D STRAIN OF MUSCLE, FASCIA AND TENDON OF LOWER BACK, SUBSEQUENT ENCOUNTER: ICD-10-CM

## 2024-12-12 DIAGNOSIS — M54.50 BILATERAL LOW BACK PAIN WITHOUT SCIATICA, UNSPECIFIED CHRONICITY: ICD-10-CM
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
## 2024-12-12 ASSESSMENT — PAIN SCALES - GENERAL: PAINLEVEL_OUTOF10: MILD PAIN (3)

## 2024-12-12 NOTE — PROGRESS NOTES
"Jian Lindsay is a 44 year old male who presents for:  Chief Complaint   Patient presents with    Neurologic Problem        Initial Vitals:  BP (!) 146/84 (BP Location: Right arm, Cuff Size: Adult Regular)   Pulse 59   Temp 97.6  F (36.4  C) (Temporal)   Ht 5' 9\" (1.753 m)   Wt 254 lb (115.2 kg)   BMI 37.51 kg/m   Estimated body mass index is 37.51 kg/m  as calculated from the following:    Height as of this encounter: 5' 9\" (1.753 m).    Weight as of this encounter: 254 lb (115.2 kg).. Body surface area is 2.37 meters squared. BP completed using cuff size: regular  Mild Pain (3)    Nursing Comments:     Mei Dickey CMA    "
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
## 2024-12-12 NOTE — PATIENT INSTRUCTIONS
-Continue physical therapy as previously recommended.   -Contact our office if symptoms persist or worsen.  -Please contact our clinic with questions or concerns at [phone].

## 2025-04-21 DIAGNOSIS — R35.0 URINARY FREQUENCY: ICD-10-CM

## 2025-04-21 NOTE — TELEPHONE ENCOUNTER
Requested Prescriptions   Pending Prescriptions Disp Refills    solifenacin (VESICARE) 5 MG tablet 90 tablet 1     Sig: Take 1 tablet (5 mg) by mouth daily.       There is no refill protocol information for this order          Last office visit: 4/23/2024 ; last virtual visit: Visit date not found with prescribing provider:  Connie Blank     Future Office Visit:      Kat Mcclellan   Clinic Station    [facility]  [phone]

## 2025-04-21 NOTE — LETTER
Jian Lindsay  [address]  [place] [address]        April 22, 2025      Dear Jian Lindsay,    We received a refill request for solifenacin.  This medication request has been approved for a 90 day supply, however an appointment is needed for further refills. Our records indicate that you are due for a follow up visit with Connie Blank PA-C in Urology.    We are booking weeks to months out in advance. Please contact our office at [phone], to schedule this appointment at your earliest convenience.  Virtual appointments are also available.      Sincerely,      Your [facility] Urology care team

## 2025-04-22 RX ORDER — SOLIFENACIN SUCCINATE 5 MG/1
5 TABLET, FILM COATED ORAL DAILY
Qty: 90 TABLET | Refills: 0 | Status: SHIPPED | OUTPATIENT
Start: 2025-04-22

## 2025-04-22 NOTE — TELEPHONE ENCOUNTER
Last Urology visit was 4/23/25. Per note:    Plan:  Start solifenacin 5 mg daily. Side effects reviewed.   Follow up in 2-3 months, sooner if concerns.     Refilled solifenacin per specialty scope of practice.    Mailed letter notifying patient that an appointment is needed for further refills; ok to be virtual.    Emma Diane RN on 4/22/2025 at 8:50 AM

## 2025-05-05 ENCOUNTER — OFFICE VISIT (OUTPATIENT)
Dept: ENDOCRINOLOGY | Facility: CLINIC | Age: 45
End: 2025-05-05
Payer: COMMERCIAL

## 2025-05-05 VITALS
OXYGEN SATURATION: 96 % | SYSTOLIC BLOOD PRESSURE: 113 MMHG | RESPIRATION RATE: 16 BRPM | DIASTOLIC BLOOD PRESSURE: 79 MMHG | WEIGHT: 247.8 LBS | BODY MASS INDEX: 36.7 KG/M2 | TEMPERATURE: 98.2 F | HEART RATE: 63 BPM | HEIGHT: 69 IN

## 2025-05-05 DIAGNOSIS — R79.89 LOW TESTOSTERONE IN MALE: ICD-10-CM

## 2025-05-05 LAB
ERYTHROCYTE [DISTWIDTH] IN BLOOD BY AUTOMATED COUNT: 12 % (ref 10–15)
HCT VFR BLD AUTO: 43.2 % (ref 40–53)
HGB BLD-MCNC: 15.6 G/DL (ref 13.3–17.7)
MCH RBC QN AUTO: 30.9 PG (ref 26.5–33)
MCHC RBC AUTO-ENTMCNC: 36.1 G/DL (ref 31.5–36.5)
MCV RBC AUTO: 86 FL (ref 78–100)
PLATELET # BLD AUTO: 186 10E3/UL (ref 150–450)
RBC # BLD AUTO: 5.05 10E6/UL (ref 4.4–5.9)
WBC # BLD AUTO: 4.3 10E3/UL (ref 4–11)

## 2025-05-05 PROCEDURE — 99203 OFFICE O/P NEW LOW 30 MIN: CPT | Performed by: PHYSICIAN ASSISTANT

## 2025-05-05 PROCEDURE — 85027 COMPLETE CBC AUTOMATED: CPT | Performed by: PHYSICIAN ASSISTANT

## 2025-05-05 PROCEDURE — 83002 ASSAY OF GONADOTROPIN (LH): CPT | Performed by: PHYSICIAN ASSISTANT

## 2025-05-05 PROCEDURE — 36415 COLL VENOUS BLD VENIPUNCTURE: CPT | Performed by: PHYSICIAN ASSISTANT

## 2025-05-05 PROCEDURE — 84270 ASSAY OF SEX HORMONE GLOBUL: CPT | Performed by: PHYSICIAN ASSISTANT

## 2025-05-05 PROCEDURE — 80053 COMPREHEN METABOLIC PANEL: CPT | Performed by: PHYSICIAN ASSISTANT

## 2025-05-05 PROCEDURE — G0103 PSA SCREENING: HCPCS | Performed by: PHYSICIAN ASSISTANT

## 2025-05-05 PROCEDURE — 84146 ASSAY OF PROLACTIN: CPT | Performed by: PHYSICIAN ASSISTANT

## 2025-05-05 NOTE — LETTER
5/5/2025      Jian Lindsay  [address]  [place] [address]      Dear Colleague,    Thank you for referring your patient, Jian Lindsay, to the [facility]. Please see a copy of my visit note below.    Assessment/Plan :   Low testosterone. Jian tries to eat healthy and he is very active with work. However, he continues to struggle with fatigue. We reviewed his recent laboratory results and we discussed some of the possible causes of low testosterone. We will repeat laboratory testing today. We also discussed the different types of testosterone replacement. He would like to avoid injections, if possible. We will plan on starting topical testosterone gel. We reviewed how to apply the gel and we discussed the possible adverse effects. We will wait for the laboratory results. If his testosterone remains low, we will start with 25 mg topically daily.       I have independently reviewed and interpreted labs, imaging as indicated.      Chief complaint:  Jian is a 44 year old male seen in consultation at the request of Dr. Srinivasan for low testosterone.     I have reviewed Care Everywhere including [facility], [facility], [facility] , [facility], [place] lab reports, imaging reports and provider notes as indicated.      HISTORY OF PRESENT ILLNESS  Jian was originally diagnosed with low testosterone in 2021. At the time of diagnosis, he had been struggling with fatigue and erectile dysfunction. His primary care provider checked his blood work and he was told that he needed to see a specialist. Jian states that the specialist recommended that he start testosterone injections. He returned for a nurse visit, to learn how to inject in the medication. He noticed an almost immediate improvement. He was not aware that he would need to continue with the injections. Since he was feeling good, he stopped the injections. He felt okay for awhile but, overtime,  his fatigue started to return.    Recently, Jian has noticed that he is always tired. This is most obvious in the morning when he wakes up. He typically gets about 5 hrs of sleep. He has no history of sleep apnea. He will nap during the day, as needed but that doesn't really help. He is just always tired. 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 He was discussing this with his PCP. He also mentioned his history of low testosterone. They checked his levels and he was told, again, to see a specialist.    Endocrine relevant labs are as follows:   Latest Reference Range & Units 10/31/24 13:24   Testosterone Total 240 - 950 ng/dL 151 (L)   (L): Data is abnormally low   Latest Reference Range & Units 10/01/24 11:14   Testosterone Total 240 - 950 ng/dL 226 (L)   (L): Data is abnormally low   Latest Reference Range & Units 10/01/24 11:14   Free Testosterone Calculated ng/dL 5.06      Latest Reference Range & Units 10/01/24 11:14   Hemoglobin A1C 0.0 - 5.6 % 5.3     REVIEW OF SYSTEMS    Endocrine: positive for low testosterone  Skin: negative  Eyes: negative for, visual blurring, double vision  Ears/Nose/Throat: negative  Respiratory: No shortness of breath, dyspnea on exertion, cough, or hemoptysis  Cardiovascular: negative for, chest pain, dyspnea on exertion, and exercise intolerance  Gastrointestinal: negative for, nausea, vomiting, and abdominal pain  Genitourinary: positive for nocturia, negative for, dysuria, frequency, and urgency  Musculoskeletal: negative for, muscular weakness, nocturnal cramping, and foot pain  Neurologic: negative for, local weakness, numbness or tingling of hands, and numbness or tingling of feet  Psychiatric: negative  Hematologic/Lymphatic/Immunologic: negative    Past Medical History  History reviewed. No pertinent past medical history.    Medications  Current Outpatient Medications   Medication Sig Dispense Refill      "clindamycin-benzoyl peroxide (BENZACLIN) 1-5 % external gel Apply topically 2 times daily. (Patient not taking: Reported on 5/5/2025)       mometasone (ELOCON) 0.1 % external solution Apply topically daily. (Patient not taking: Reported on 5/5/2025)       mupirocin (BACTROBAN) 2 % external ointment Apply topically 2 times daily. (Patient not taking: Reported on 5/5/2025)       solifenacin (VESICARE) 5 MG tablet Take 1 tablet (5 mg) by mouth daily. (Patient not taking: Reported on 5/5/2025) 90 tablet 0     tiZANidine (ZANAFLEX) 2 MG tablet Take 1 tablet (2 mg) by mouth every evening as needed for muscle spasms. (Patient not taking: Reported on 5/5/2025) 20 tablet 0       Allergies  No Known Allergies      Family History  family history is not on file.    Social History  Social History     Tobacco Use     Smoking status: Never     Smokeless tobacco: Never     Tobacco comments:     During high school days.    Vaping Use     Vaping status: Never Used   Substance Use Topics     Drug use: Not Currently     Types: Marijuana       Physical Exam  /79 (BP Location: Left arm, Patient Position: Chair, Cuff Size: Adult Large)   Pulse 63   Temp 98.2  F (36.8  C) (Tympanic)   Resp 16   Ht 1.753 m (5' 9.02\")   Wt 112.4 kg (247 lb 12.8 oz)   SpO2 96%   BMI 36.58 kg/m    Body mass index is 36.58 kg/m .  GENERAL :  In no apparent distress  SKIN: Normal color, normal temperature, texture.  No hirsutism, alopecia or purple striae.     EYES: PERRL, EOMI, No scleral icterus,  No proptosis, conjunctival redness, stare, retraction  RESP: Lungs clear to auscultation bilaterally  CARDIAC: Regular rate and rhythm, normal S1 S2, without murmurs, rubs or gallops    NEURO: awake, alert, responds appropriately to questions.  Cranial nerves intact.   Moves all extremities; Gait normal.  No tremor of the outstretched hand.    EXTREMITIES: No clubbing, cyanosis or edema.              Again, thank you for allowing me to participate in the " care of your patient.        Sincerely,        Ana Sawant PA-C    Electronically signed

## 2025-05-05 NOTE — PROGRESS NOTES
Assessment/Plan :   Low testosterone. Jian tries to eat healthy and he is very active with work. However, he continues to struggle with fatigue. We reviewed his recent laboratory results and we discussed some of the possible causes of low testosterone. We will repeat laboratory testing today. We also discussed the different types of testosterone replacement. He would like to avoid injections, if possible. We will plan on starting topical testosterone gel. We reviewed how to apply the gel and we discussed the possible adverse effects. We will wait for the laboratory results. If his testosterone remains low, we will start with 25 mg topically daily.       I have independently reviewed and interpreted labs, imaging as indicated.      Chief complaint:  Jian is a 44 year old male seen in consultation at the request of Dr. Srinivasan for low testosterone.     I have reviewed Care Everywhere including [facility], [facility], [facility] , [facility], [place] lab reports, imaging reports and provider notes as indicated.      HISTORY OF PRESENT ILLNESS  Jian was originally diagnosed with low testosterone in 2021. At the time of diagnosis, he had been struggling with fatigue and erectile dysfunction. His primary care provider checked his blood work and he was told that he needed to see a specialist. Jian states that the specialist recommended that he start testosterone injections. He returned for a nurse visit, to learn how to inject in the medication. He noticed an almost immediate improvement. He was not aware that he would need to continue with the injections. Since he was feeling good, he stopped the injections. He felt okay for awhile but, overtime, his fatigue started to return.    Recently, Jian has noticed that he is always tired. This is most obvious in the morning when he wakes up. He typically gets about 5 hrs of sleep. He has no history of sleep apnea. He will nap during  the day, as needed but that doesn't really help. He is just always tired. 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 He was discussing this with his PCP. He also mentioned his history of low testosterone. They checked his levels and he was told, again, to see a specialist.    Endocrine relevant labs are as follows:   Latest Reference Range & Units 10/31/24 13:24   Testosterone Total 240 - 950 ng/dL 151 (L)   (L): Data is abnormally low   Latest Reference Range & Units 10/01/24 11:14   Testosterone Total 240 - 950 ng/dL 226 (L)   (L): Data is abnormally low   Latest Reference Range & Units 10/01/24 11:14   Free Testosterone Calculated ng/dL 5.06      Latest Reference Range & Units 10/01/24 11:14   Hemoglobin A1C 0.0 - 5.6 % 5.3     REVIEW OF SYSTEMS    Endocrine: positive for low testosterone  Skin: negative  Eyes: negative for, visual blurring, double vision  Ears/Nose/Throat: negative  Respiratory: No shortness of breath, dyspnea on exertion, cough, or hemoptysis  Cardiovascular: negative for, chest pain, dyspnea on exertion, and exercise intolerance  Gastrointestinal: negative for, nausea, vomiting, and abdominal pain  Genitourinary: positive for nocturia, negative for, dysuria, frequency, and urgency  Musculoskeletal: negative for, muscular weakness, nocturnal cramping, and foot pain  Neurologic: negative for, local weakness, numbness or tingling of hands, and numbness or tingling of feet  Psychiatric: negative  Hematologic/Lymphatic/Immunologic: negative    Past Medical History  History reviewed. No pertinent past medical history.    Medications  Current Outpatient Medications   Medication Sig Dispense Refill    clindamycin-benzoyl peroxide (BENZACLIN) 1-5 % external gel Apply topically 2 times daily. (Patient not taking: Reported on 5/5/2025)      mometasone (ELOCON) 0.1 % external solution Apply topically daily. (Patient not taking: Reported on  "5/5/2025)      mupirocin (BACTROBAN) 2 % external ointment Apply topically 2 times daily. (Patient not taking: Reported on 5/5/2025)      solifenacin (VESICARE) 5 MG tablet Take 1 tablet (5 mg) by mouth daily. (Patient not taking: Reported on 5/5/2025) 90 tablet 0    tiZANidine (ZANAFLEX) 2 MG tablet Take 1 tablet (2 mg) by mouth every evening as needed for muscle spasms. (Patient not taking: Reported on 5/5/2025) 20 tablet 0       Allergies  No Known Allergies      Family History  family history is not on file.    Social History  Social History     Tobacco Use    Smoking status: Never    Smokeless tobacco: Never    Tobacco comments:     During high school days.    Vaping Use    Vaping status: Never Used   Substance Use Topics    Drug use: Not Currently     Types: Marijuana       Physical Exam  /79 (BP Location: Left arm, Patient Position: Chair, Cuff Size: Adult Large)   Pulse 63   Temp 98.2  F (36.8  C) (Tympanic)   Resp 16   Ht 1.753 m (5' 9.02\")   Wt 112.4 kg (247 lb 12.8 oz)   SpO2 96%   BMI 36.58 kg/m    Body mass index is 36.58 kg/m .  GENERAL :  In no apparent distress  SKIN: Normal color, normal temperature, texture.  No hirsutism, alopecia or purple striae.     EYES: PERRL, EOMI, No scleral icterus,  No proptosis, conjunctival redness, stare, retraction  RESP: Lungs clear to auscultation bilaterally  CARDIAC: Regular rate and rhythm, normal S1 S2, without murmurs, rubs or gallops    NEURO: awake, alert, responds appropriately to questions.  Cranial nerves intact.   Moves all extremities; Gait normal.  No tremor of the outstretched hand.    EXTREMITIES: No clubbing, cyanosis or edema.            "

## 2025-05-06 LAB
ALBUMIN SERPL BCG-MCNC: 4.2 G/DL (ref 3.5–5.2)
ALP SERPL-CCNC: 82 U/L (ref 40–150)
ALT SERPL W P-5'-P-CCNC: 25 U/L (ref 0–70)
ANION GAP SERPL CALCULATED.3IONS-SCNC: 8 MMOL/L (ref 7–15)
AST SERPL W P-5'-P-CCNC: 27 U/L (ref 0–45)
BILIRUB SERPL-MCNC: 0.4 MG/DL
BUN SERPL-MCNC: 8.8 MG/DL (ref 6–20)
CALCIUM SERPL-MCNC: 9 MG/DL (ref 8.8–10.4)
CHLORIDE SERPL-SCNC: 107 MMOL/L (ref 98–107)
CREAT SERPL-MCNC: 0.98 MG/DL (ref 0.67–1.17)
EGFRCR SERPLBLD CKD-EPI 2021: >90 ML/MIN/1.73M2
GLUCOSE SERPL-MCNC: 99 MG/DL (ref 70–99)
HCO3 SERPL-SCNC: 27 MMOL/L (ref 22–29)
LH SERPL-ACNC: 1.5 MIU/ML (ref 1.7–8.6)
POTASSIUM SERPL-SCNC: 4.4 MMOL/L (ref 3.4–5.3)
PROLACTIN SERPL 3RD IS-MCNC: 17 NG/ML (ref 4–15)
PROT SERPL-MCNC: 7.4 G/DL (ref 6.4–8.3)
PSA SERPL DL<=0.01 NG/ML-MCNC: 0.31 NG/ML (ref 0–2.5)
SHBG SERPL-SCNC: 26 NMOL/L (ref 11–80)
SODIUM SERPL-SCNC: 142 MMOL/L (ref 135–145)

## 2025-05-10 LAB
TESTOST FREE SERPL-MCNC: 4.05 NG/DL
TESTOST SERPL-MCNC: 187 NG/DL (ref 240–950)

## 2025-05-12 ENCOUNTER — TELEPHONE (OUTPATIENT)
Dept: ENDOCRINOLOGY | Facility: CLINIC | Age: 45
End: 2025-05-12
Payer: COMMERCIAL

## 2025-05-12 NOTE — TELEPHONE ENCOUNTER
Patient is calling to ask if Ana Sawant is going to send a prescription to his pharmacy based on his latest lab results.

## 2025-05-13 ENCOUNTER — RESULTS FOLLOW-UP (OUTPATIENT)
Dept: ENDOCRINOLOGY | Facility: CLINIC | Age: 45
End: 2025-05-13

## 2025-05-13 DIAGNOSIS — R79.89 LOW TESTOSTERONE IN MALE: Primary | ICD-10-CM

## 2025-05-13 RX ORDER — TESTOSTERONE 1.62 MG/G
2 GEL TRANSDERMAL DAILY
Qty: 150 G | Refills: 1 | Status: SHIPPED | OUTPATIENT
Start: 2025-05-13

## 2025-08-04 ENCOUNTER — LAB (OUTPATIENT)
Dept: LAB | Facility: OTHER | Age: 45
End: 2025-08-04

## 2025-08-04 DIAGNOSIS — R79.89 LOW TESTOSTERONE IN MALE: ICD-10-CM

## 2025-08-04 LAB
ANION GAP SERPL CALCULATED.3IONS-SCNC: 9 MMOL/L (ref 7–15)
BUN SERPL-MCNC: 6.1 MG/DL (ref 6–20)
CALCIUM SERPL-MCNC: 9 MG/DL (ref 8.8–10.4)
CHLORIDE SERPL-SCNC: 105 MMOL/L (ref 98–107)
CREAT SERPL-MCNC: 0.99 MG/DL (ref 0.67–1.17)
EGFRCR SERPLBLD CKD-EPI 2021: >90 ML/MIN/1.73M2
ERYTHROCYTE [DISTWIDTH] IN BLOOD BY AUTOMATED COUNT: 11.8 % (ref 10–15)
GLUCOSE SERPL-MCNC: 99 MG/DL (ref 70–99)
HCO3 SERPL-SCNC: 26 MMOL/L (ref 22–29)
HCT VFR BLD AUTO: 47.2 % (ref 40–53)
HGB BLD-MCNC: 16.6 G/DL (ref 13.3–17.7)
MCH RBC QN AUTO: 30.5 PG (ref 26.5–33)
MCHC RBC AUTO-ENTMCNC: 35.2 G/DL (ref 31.5–36.5)
MCV RBC AUTO: 87 FL (ref 78–100)
PLATELET # BLD AUTO: 192 10E3/UL (ref 150–450)
POTASSIUM SERPL-SCNC: 4.3 MMOL/L (ref 3.4–5.3)
RBC # BLD AUTO: 5.45 10E6/UL (ref 4.4–5.9)
SHBG SERPL-SCNC: 23 NMOL/L (ref 11–80)
SODIUM SERPL-SCNC: 140 MMOL/L (ref 135–145)
WBC # BLD AUTO: 4.8 10E3/UL (ref 4–11)

## 2025-08-04 PROCEDURE — 36415 COLL VENOUS BLD VENIPUNCTURE: CPT

## 2025-08-04 PROCEDURE — 85027 COMPLETE CBC AUTOMATED: CPT

## 2025-08-04 PROCEDURE — 80048 BASIC METABOLIC PNL TOTAL CA: CPT

## 2025-08-04 PROCEDURE — 84403 ASSAY OF TOTAL TESTOSTERONE: CPT

## 2025-08-04 PROCEDURE — 84270 ASSAY OF SEX HORMONE GLOBUL: CPT

## 2025-08-06 LAB
TESTOST FREE SERPL-MCNC: 4.85 NG/DL
TESTOST SERPL-MCNC: 209 NG/DL (ref 240–950)